# Patient Record
Sex: FEMALE | Race: WHITE | NOT HISPANIC OR LATINO | Employment: OTHER | URBAN - METROPOLITAN AREA
[De-identification: names, ages, dates, MRNs, and addresses within clinical notes are randomized per-mention and may not be internally consistent; named-entity substitution may affect disease eponyms.]

---

## 2017-09-18 ENCOUNTER — OFFICE VISIT - RIVER FALLS (OUTPATIENT)
Dept: FAMILY MEDICINE | Facility: CLINIC | Age: 52
End: 2017-09-18

## 2017-09-18 ASSESSMENT — MIFFLIN-ST. JEOR: SCORE: 1257.45

## 2018-05-09 ENCOUNTER — OFFICE VISIT - RIVER FALLS (OUTPATIENT)
Dept: FAMILY MEDICINE | Facility: CLINIC | Age: 53
End: 2018-05-09

## 2022-02-12 VITALS
DIASTOLIC BLOOD PRESSURE: 76 MMHG | BODY MASS INDEX: 23.53 KG/M2 | SYSTOLIC BLOOD PRESSURE: 108 MMHG | HEIGHT: 67 IN | HEART RATE: 76 BPM | TEMPERATURE: 98.3 F

## 2022-02-12 VITALS
SYSTOLIC BLOOD PRESSURE: 110 MMHG | HEIGHT: 67 IN | HEART RATE: 80 BPM | WEIGHT: 140.6 LBS | BODY MASS INDEX: 22.07 KG/M2 | DIASTOLIC BLOOD PRESSURE: 66 MMHG

## 2022-02-15 NOTE — LETTER
(Inserted Image. Unable to display)   [address] [place] [id number]  [phone]      January 26, 2021      JESSY TOLEDO   [address] [id number]      Dear JESSY,      Thank you for selecting [facility] for your healthcare needs. Below you will find the result of your recent test(s) done at our clinic.     This letter is to inform you that we have received a copy of your mammogram results.  Your results were normal unless noted below.  You will also receive notice of your results from the radiologist within 7-10 days.  This letter is to let you know I have also received a copy and it is filed in your clinic chart.      Please plan on having your next mammogram done in 12 months.          Please contact my practice at [phone] if you have any questions or concerns.     Sincerely,        Don Bloom PA-C

## 2022-02-15 NOTE — LETTER
(Inserted Image. Unable to display)   December 09, 2019      JESSY TOLEDO    [address] [id number]        Dear JESSY,      Thank you for selecting [facility] [[email or web address]] (previously [facility]) for your healthcare needs.     Our records indicate you are due for the following services:     Annual Physical    To schedule an appointment or if you have further questions, please contact your primary clinic:   [facility]          [phone]   [facility]    [phone]             [facility]         [phone]      Powered by [employer]    Sincerely,    Don Bloom PA-C

## 2022-02-15 NOTE — PROGRESS NOTES
Patient:   JESSY TOLEDO            MRN: [id number]            FIN: [id number]               Age:   52 years     Sex:  Female     :  1965   Associated Diagnoses:   External hemorrhoid   Author:   Hawk Ramirez MD      Chief Complaint   2018 12:59 PM CDT    Constipation x 1 week, now having pain        History of Present Illness   patient was constipated last week, treated constipation, now has pain near rectum, no bleeding. hurts to wipe, sometimes hurts to sit. no drainage         Health Status   Allergies:    Allergic Reactions (All)  Severity Not Documented  Penicillin (No reactions were documented)   Medications:    Medications          No Recorded Medications        Histories   Past Medical History:    No active or resolved past medical history items have been selected or recorded.   Family History:    No family history items have been selected or recorded.   Procedure history:    Hysterectomy ([id number]) in  at 35 Years.  Comments:  2013 1:45 PM - Melia Lewis LPN  No cervix   Social History:        Tobacco Assessment: Denies Tobacco Use      Exercise and Physical Activity Assessment: Occasional exercise        Physical Examination   Vital Signs   2018 12:59 PM CDT Temperature Tympanic 98.3 DegF    Peripheral Pulse Rate 76 bpm    Pulse Site Radial artery    HR Method Manual    Systolic Blood Pressure 108 mmHg    Diastolic Blood Pressure 76 mmHg    Mean Arterial Pressure 87 mmHg    BP Site Right arm    BP Method Manual      Measurements from flowsheet : Measurements   2018 12:59 PM CDT    Height Measured - Standard                66.5 in     on rectal exam at 5 oclock is swelling under skin with blue-purple discoloration, very tender      Impression and Plan   Diagnosis     External hemorrhoid (WVO49-IZ K64.4).     Orders     Orders   Pharmacy:  hydrocortisone-pramoxine 2.35%-1% rectal cream (Prescribe): See Instructions, Instructions: pr tid, # 30 gm, 1 Refill(s), Type:  Maintenance, Pharmacy: [facility] #[id number], pr tid.

## 2022-02-15 NOTE — PROGRESS NOTES
Patient:   JESSY TOLEDO            MRN: [id number]            FIN: [id number]               Age:   51 years     Sex:  Female     :  1965   Associated Diagnoses:   Right shoulder pain   Author:   Don Bloom PA-C      Visit Information   Visit type:  New symptom.    Accompanied by:  No one.    Source of history:  Self.    Referral source:  Self.    History limitation:  None.       Chief Complaint   2017 1:38 PM CDT    Right Shoulder pain - shooting pain from elbow to shoulder, any use makes it throb x 2 weeks      History of Present Illness             The patient presents with upper extremity pain.  The location of pain is the right and shoulder(s).  The pain is characterized by aching, throbbing and with movement.  The severity of the pain is moderate.  The timing/course of the pain is constant.  The pain occurred 2 week(s).  The context of the pain: occurred with movement.   for a living. Pain past two weeks. Did rest and improved, now worse again. Diffuse shoulder pain. No neck pain. No weakness. CC above noted and confirmed with the patient..  Relieving factors consist of none.  Associated symptoms consist of decreased range of motion, denies numbness and denies tingling.  Prior treatment consists of none.  See CC above.   .        Review of Systems   Constitutional:  Negative.    Respiratory:  Negative.    Cardiovascular:  Negative.    Musculoskeletal:  Negative except as documented in history of present illness.    Integumentary:  Negative.    Neurologic:  Negative.       Health Status   Allergies:    Allergic Reactions (All)  Severity Not Documented  Penicillin (No reactions were documented)   Medications:  (Selected)      Problem list:    No problem items selected or recorded.      Histories   Past Medical History:    No active or resolved past medical history items have been selected or recorded.   Family History:    No family history items have been selected or recorded.    Procedure history:    Hysterectomy ([id number]) in 2000 at 35 Years.  Comments:  7/24/2013 1:45 PM - Melia Lewis LPN  No cervix   Social History:        Tobacco Assessment: Denies Tobacco Use      Exercise and Physical Activity Assessment: Occasional exercise        Physical Examination   Vital Signs   9/18/2017 1:38 PM CDT Peripheral Pulse Rate 80 bpm    Pulse Site Radial artery    HR Method Manual    Systolic Blood Pressure 110 mmHg    Diastolic Blood Pressure 66 mmHg    Mean Arterial Pressure 81 mmHg    BP Site Left arm    BP Method Manual      Measurements from flowsheet : Measurements   9/18/2017 1:38 PM CDT Height Measured - Standard 66.5 in    Weight Measured - Standard 140.6 lb    BSA 1.73 m2    Body Mass Index 22.35 kg/m2      General:  Alert and oriented, Moderate distress.    Cardiovascular:  Good pulses equal in all extremities, Normal peripheral perfusion.    Musculoskeletal:  No swelling, Unable to abduct beyond about 90 degrees due to pain. Strength appears to be well maintained., Good ROM of the neck., No elbow or forearm pain. Normal strength of UEs..    Integumentary:  No rash.    Neurologic:  No focal deficits.    Psychiatric:  Cooperative, Appropriate mood & affect.       Review / Management   Radiology results   Appears normal to my read, waiting for official read.  Will contact patient with any other findings.      Impression and Plan   Diagnosis     Right shoulder pain (WFJ78-SP M25.511).     Patient Instructions:       Counseled: Patient, Regarding diagnosis, Regarding medications, Activity, Verbalized understanding.    Orders     Orders (Selected)   Prescriptions  Prescribed  Medrol Dosepak 4 mg oral tablet: 1 packet(s), PO, Once, Instructions: as directed on package labeling, # 21 tab(s), 0 Refill(s), Type: Soft Stop, Pharmacy: [facility] #[id number], 1 packet(s) po once,Instr:as directed on package labeling.     Call in one week if not better, and will discuss options.

## 2022-09-21 ENCOUNTER — OFFICE VISIT (OUTPATIENT)
Dept: FAMILY MEDICINE | Facility: CLINIC | Age: 57
End: 2022-09-21
Payer: COMMERCIAL

## 2022-09-21 VITALS
HEART RATE: 75 BPM | DIASTOLIC BLOOD PRESSURE: 80 MMHG | WEIGHT: 151.24 LBS | SYSTOLIC BLOOD PRESSURE: 132 MMHG | BODY MASS INDEX: 24.04 KG/M2

## 2022-09-21 DIAGNOSIS — Z13.220 SCREENING FOR HYPERLIPIDEMIA: ICD-10-CM

## 2022-09-21 LAB
CHOLEST SERPL-MCNC: 233 MG/DL
HDLC SERPL-MCNC: 69 MG/DL
LDLC SERPL CALC-MCNC: 152 MG/DL
NONHDLC SERPL-MCNC: 164 MG/DL
TRIGL SERPL-MCNC: 60 MG/DL

## 2022-09-21 PROCEDURE — 36415 COLL VENOUS BLD VENIPUNCTURE: CPT | Performed by: PHYSICIAN ASSISTANT

## 2022-09-21 PROCEDURE — 80061 LIPID PANEL: CPT | Performed by: PHYSICIAN ASSISTANT

## 2022-09-21 PROCEDURE — 99203 OFFICE O/P NEW LOW 30 MIN: CPT | Performed by: PHYSICIAN ASSISTANT

## 2022-09-21 ASSESSMENT — ENCOUNTER SYMPTOMS
RESPIRATORY NEGATIVE: 1
CARDIOVASCULAR NEGATIVE: 1

## 2022-09-21 NOTE — LETTER
September 21, 2022      Hang Mckeon   [address] [address]        Dear ,    We are writing to inform you of your test results.    Your lipid levels show that your total cholesterol and LDL, bad, cholesterol are both elevated.  Please look at the information that I have enclosed for your review try to watch a low-fat low-cholesterol diet and we should recheck your levels in 1 year.    Resulted Orders   Lipid panel reflex to direct LDL Non-fasting   Result Value Ref Range    Cholesterol 233 (H) <200 mg/dL    Triglycerides 60 <150 mg/dL    Direct Measure HDL 69 >=50 mg/dL    LDL Cholesterol Calculated 152 (H) <=100 mg/dL    Non HDL Cholesterol 164 (H) <130 mg/dL    Narrative    Cholesterol  Desirable:  <200 mg/dL    Triglycerides  Normal:  Less than 150 mg/dL  Borderline High:  150-199 mg/dL  High:  200-499 mg/dL  Very High:  Greater than or equal to 500 mg/dL    Direct Measure HDL  Female:  Greater than or equal to 50 mg/dL   Male:  Greater than or equal to 40 mg/dL    LDL Cholesterol  Desirable:  <100mg/dL  Above Desirable:  100-129 mg/dL   Borderline High:  130-159 mg/dL   High:  160-189 mg/dL   Very High:  >= 190 mg/dL    Non HDL Cholesterol  Desirable:  130 mg/dL  Above Desirable:  130-159 mg/dL  Borderline High:  160-189 mg/dL  High:  190-219 mg/dL  Very High:  Greater than or equal to 220 mg/dL       If you have any questions or concerns, please call the clinic at the number listed above.       Sincerely,      BRUNO Fulton

## 2022-09-21 NOTE — PROGRESS NOTES
Assessment & Plan     Screening for hyperlipidemia  Labs pending  - Lipid panel reflex to direct LDL Non-fasting  - Lipid panel reflex to direct LDL Non-fasting                   No follow-ups on file.    BRUNO Grier  [facility]    Tawanna Mauricio is a 56 year old, presenting for the following health issues:  Lipids      56-year-old female presents to the clinic requesting lipid panel.  It has been a number of years since she has had her cholesterol checked.  There is family history of dyslipidemia.  She has no chest pain no shortness of breath.  She does get annual mammograms  She declines other services such as Pap smear or colon cancer screening  She declines updating immunizations  She declines HIV and hepatitis C check    History of Present Illness       Reason for visit:  Cholesterol checked    She eats 0-1 servings of fruits and vegetables daily.She consumes 1 sweetened beverage(s) daily.She exercises with enough effort to increase her heart rate 60 or more minutes per day.  She exercises with enough effort to increase her heart rate 3 or less days per week.   She is taking medications regularly.             Review of Systems   Respiratory: Negative.    Cardiovascular: Negative.             Objective    /80 (BP Location: Right arm, Cuff Size: Adult Regular)   Pulse 75   Wt 68.6 kg (151 lb 3.8 oz)   BMI 24.04 kg/m    Body mass index is 24.04 kg/m .  Physical Exam  Vitals reviewed.   Constitutional:       Appearance: Normal appearance.   HENT:      Head: Normocephalic and atraumatic.   Cardiovascular:      Rate and Rhythm: Normal rate and regular rhythm.      Heart sounds: Normal heart sounds.   Pulmonary:      Effort: Pulmonary effort is normal.      Breath sounds: Normal breath sounds.   Neurological:      Mental Status: She is alert.

## 2022-10-03 ENCOUNTER — HEALTH MAINTENANCE LETTER (OUTPATIENT)
Age: 57
End: 2022-10-03

## 2023-10-22 ENCOUNTER — HEALTH MAINTENANCE LETTER (OUTPATIENT)
Age: 58
End: 2023-10-22

## 2023-11-15 ENCOUNTER — OFFICE VISIT (OUTPATIENT)
Dept: FAMILY MEDICINE | Facility: CLINIC | Age: 58
End: 2023-11-15
Payer: COMMERCIAL

## 2023-11-15 ENCOUNTER — ANCILLARY PROCEDURE (OUTPATIENT)
Dept: GENERAL RADIOLOGY | Facility: CLINIC | Age: 58
End: 2023-11-15
Attending: PHYSICIAN ASSISTANT
Payer: COMMERCIAL

## 2023-11-15 VITALS
BODY MASS INDEX: 23.73 KG/M2 | WEIGHT: 151.2 LBS | HEART RATE: 64 BPM | OXYGEN SATURATION: 99 % | RESPIRATION RATE: 18 BRPM | HEIGHT: 67 IN | SYSTOLIC BLOOD PRESSURE: 116 MMHG | TEMPERATURE: 97.6 F | DIASTOLIC BLOOD PRESSURE: 80 MMHG

## 2023-11-15 DIAGNOSIS — M54.50 LUMBAR PAIN: Primary | ICD-10-CM

## 2023-11-15 DIAGNOSIS — M54.50 LUMBAR PAIN: ICD-10-CM

## 2023-11-15 PROCEDURE — 72100 X-RAY EXAM L-S SPINE 2/3 VWS: CPT | Mod: TC | Performed by: INTERNAL MEDICINE

## 2023-11-15 PROCEDURE — 99213 OFFICE O/P EST LOW 20 MIN: CPT | Performed by: PHYSICIAN ASSISTANT

## 2023-11-15 RX ORDER — METHYLPREDNISOLONE 4 MG
TABLET, DOSE PACK ORAL
Qty: 21 TABLET | Refills: 0 | Status: SHIPPED | OUTPATIENT
Start: 2023-11-15

## 2023-11-15 RX ORDER — ONDANSETRON 4 MG/1
4 TABLET, ORALLY DISINTEGRATING ORAL EVERY 6 HOURS PRN
COMMUNITY
Start: 2023-11-06

## 2023-11-15 ASSESSMENT — ENCOUNTER SYMPTOMS
FEVER: 0
BACK PAIN: 1
NUMBNESS: 0
PARESTHESIAS: 0
CHILLS: 1
GASTROINTESTINAL NEGATIVE: 1

## 2023-11-15 ASSESSMENT — PAIN SCALES - GENERAL: PAINLEVEL: EXTREME PAIN (8)

## 2023-11-15 NOTE — PROGRESS NOTES
Assessment & Plan     Lumbar pain  Treat with Medrol Dosepak range of motion exercises try not to overdo exertionally if not improved by the first of the week consider physical therapy and or MRI if she develops fever chills bowel or bladder incontinence worsening pain she is instructed to be seen emergently  - XR Lumbar Spine 2/3 Views             MED REC REQUIRED  Post Medication Reconciliation Status:       BRUNO Grier  [facility]    Tawanna Mauricio is a 57 year old, presenting for the following health issues:  ER F/U (Seen at [facility] 11/5/23 left lower back pain with sciatica, not getting better )        11/15/2023     9:59 AM   Additional Questions   Roomed by MITCHELL Rebolledo       57-year-old presents to the clinic for ongoing back pain  10 days ago she injured her back while lifting/moving a deer  It was a 12 point block so it was heavy  He was seen in the emergency room on the fifth had an unremarkable CT abdomen and pelvis  Yesterday she felt somewhat better today it is worse  No urinary symptoms no bowel complaints  She has some pain that wraps around the left inguinal region from the lumbar region  She has a little hamstring discomfort she has nothing below the knee  She said no rash  She feels chills at times but has not had fever           ED/UC Followup:    Facility:  [facility]  Date of visit: 11/5/23  Reason for visit: left lower back pain with sciatica    Current Status: worsening          Review of Systems   Constitutional:  Positive for chills. Negative for fever.   Gastrointestinal: Negative.    Genitourinary: Negative.    Musculoskeletal:  Positive for back pain. Negative for gait problem.   Skin: Negative.    Neurological:  Negative for numbness and paresthesias.            Objective    BP (!) 140/80 (BP Location: Right arm, Patient Position: Sitting, Cuff Size: Adult Regular)   Pulse 64   Temp 97.6  F (36.4  C) (Tympanic)   Resp 18   " Ht 1.689 m (5' 6.5\")   Wt 68.6 kg (151 lb 3.2 oz)   LMP  (LMP Unknown)   SpO2 99%   BMI 24.04 kg/m    Body mass index is 24.04 kg/m .  Physical Exam alert attentive  Lungs clear well ventilated  Cardiovascular gait and rhythm  No CVA tenderness  Abdomen NABS soft nontender no palpable masses organomegaly  Her range of motion of her lumbar spine is full  Strength is +5/5 lower extremities bilaterally  Reflexes brisk and equal +2 patellar and Achilles bilaterally  There is no rash                Results for orders placed or performed in visit on 11/15/23   XR Lumbar Spine 2/3 Views     Status: None    Narrative    EXAM: XR LUMBAR SPINE 2/3 VIEWS  LOCATION: [facility]  DATE: 11/15/2023    INDICATION: Lumbar pain.  COMPARISON: 11/05/2023 CT abdomen/pelvis.      Impression    IMPRESSION: No fracture. Mild lumbar levocurvature. Trace grade 1 anterolisthesis of L3 on L4 and L5 on S1, as well as retrolisthesis of L4 on L5. Bilateral L4 pars defects. Vertebral bodies are maintained without findings to suggest an acute fracture.   Mild multilevel disc space height loss, endplate osteophyte formation, and lower lumbar predominant facet arthropathy.              "

## 2024-12-14 ENCOUNTER — HEALTH MAINTENANCE LETTER (OUTPATIENT)
Age: 59
End: 2024-12-14